# Patient Record
Sex: FEMALE | Race: WHITE | NOT HISPANIC OR LATINO | Employment: STUDENT | URBAN - METROPOLITAN AREA
[De-identification: names, ages, dates, MRNs, and addresses within clinical notes are randomized per-mention and may not be internally consistent; named-entity substitution may affect disease eponyms.]

---

## 2020-01-24 NOTE — TELEPHONE ENCOUNTER
FUTURE VISIT INFORMATION      FUTURE VISIT INFORMATION:    Date: 3/2/20    Time: 8:20 AM with SLP    Location: [facility]  REFERRAL INFORMATION:    Referring provider:  Self-Referred    Referring providers clinic:  NA    Reason for visit/diagnosis: Hoarseness and loss of vocal range    RECORDS REQUESTED FROM:       Clinic name Comments Records Status Imaging Status                                         * All Records in [facility], no records in [place] closing encounter - [place]

## 2020-03-02 ENCOUNTER — PRE VISIT (OUTPATIENT)
Dept: OTOLARYNGOLOGY | Facility: CLINIC | Age: 20
End: 2020-03-02

## 2020-03-02 ENCOUNTER — OFFICE VISIT (OUTPATIENT)
Dept: OTOLARYNGOLOGY | Facility: CLINIC | Age: 20
End: 2020-03-02
Payer: COMMERCIAL

## 2020-03-02 VITALS — HEIGHT: 65 IN | BODY MASS INDEX: 22.16 KG/M2 | WEIGHT: 133 LBS

## 2020-03-02 DIAGNOSIS — J38.7 IRRITABLE LARYNX: ICD-10-CM

## 2020-03-02 DIAGNOSIS — R49.0 MUSCLE TENSION DYSPHONIA: ICD-10-CM

## 2020-03-02 DIAGNOSIS — R49.0 DYSPHONIA: Primary | ICD-10-CM

## 2020-03-02 DIAGNOSIS — R09.89 CHRONIC THROAT CLEARING: ICD-10-CM

## 2020-03-02 RX ORDER — DESOGESTREL AND ETHINYL ESTRADIOL 21-5 (28)
KIT ORAL
COMMUNITY
Start: 2019-08-18

## 2020-03-02 RX ORDER — PANTOPRAZOLE SODIUM 20 MG/1
TABLET, DELAYED RELEASE ORAL
COMMUNITY
Start: 2019-11-19

## 2020-03-02 ASSESSMENT — PAIN SCALES - GENERAL: PAINLEVEL: NO PAIN (0)

## 2020-03-02 ASSESSMENT — MIFFLIN-ST. JEOR: SCORE: 1379.16

## 2020-03-02 NOTE — LETTER
3/2/2020      RE: Jayed Cevallos  [address] [address]       Dear Colleague:    I had the pleasure of meeting Jayde Cevallos in consultation at the [facility] of the [facility] at your request, for evaluation of dysphonia. The note from our visit follows. Speech recognition software may have been used in the documentation below; input is reviewed before signature to the best of my ability. I appreciate the opportunity to participate in the care of this pleasant patient.    Please feel free to contact me with any questions.    Sincerely yours,    Kimberly Zhu M.D., M.P.H.  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 Symptoms include throat tightness, mucus in throat and frequent throat-clearing.      Voice  ***She reports a five-month history of voice problems, since a potential exposure to volcanic tami while traveling in [place] in September.  She lost her voice for about three weeks after hiking in a volcano in [facility].  Her voice has improved somewhat since then, but she continues to have vocal fatigue and difficulty reaching high notes.  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  Typical vocal demand is extraordinary including singing and socializing.    She was seen for stroboscopy in [place] and was diagnosed with reflux. She tried a number of different reflux medications.  More recently, she was seen by a gastroenterologist back in the [place], and further evaluation was not recommended at that time as she really did not have symptoms consistent with classic GERD and the timeline of her symptoms was more closely  tied to being in [place].  Records from her GI visit are not currently available for review.***  ***  Reportedly, she was unaware that this appointment with us was being scheduled, as it was arranged by her father.  Her mother is a vocal  and has been helping her with her voice symptoms.      Swallowing  No concerns.       Cough/Throat-clearing  ***She has also had throat clearing since the middle of October.  This began suddenly and may be improving somewhat but is quite persistent overall.  She associates this with a feeling of mucus.  It typically begins with a sensation in her throat and is usually heavy and productive.      Breathing  No concerns.       Throat discomfort  No concerns.       Reflux-type symptoms  She experiences heartburn/indigestion rarely. She is taking reflux medications.  She did feel that they were helpful with reducing the feeling of throat swelling when she first started taking them in [place].  When she refilled the medications in the [place], she felt that perhaps they were not helping as much.       MEDICATIONS:     Current Outpatient Medications   Medication Sig Dispense Refill     desogestrel-ethinyl estradiol (KARIVA) 0.15-0.02/0.01 MG (21/5) tablet        pantoprazole (PROTONIX) 20 MG EC tablet          ALLERGIES:    Allergies   Allergen Reactions     Cats Itching, Other (See Comments) and Swelling       PAST MEDICAL HISTORY: No past medical history on file.     PAST SURGICAL HISTORY: No past surgical history on file.    HABITS/SOCIAL HISTORY:    Social History     Tobacco Use     Smoking status: Never Smoker     Smokeless tobacco: Never Used   Substance Use Topics     Alcohol use: Not on file         FAMILY HISTORY:  No family history on file. Noncontributory.    REVIEW OF SYSTEMS:  The patient completed a comprehensive 11 point review of systems (below), which was reviewed. Positives are as noted below; pertinent findings are as noted in the HPI.     Patient Supplied  Answers to Review of Systems   ENT ROS 3/1/2020   Neurology Dizzy spells   Ears, Nose, Throat Nasal congestion or drainage   Gastrointestinal/Genitourinary Heartburn/indigestion            PHYSICAL EXAMINATION:  General: The patient was alert and conversant, and in no acute distress.    Eyes: PERRL, conjunctiva and lids normal, sclera nonicteric.  Nose: Anterior rhinoscopy: no gross abnormalities. no  bleeding; no  mucopurulence; septum grossly normal, mild mucoid drainage and/or crusting.  Oral cavity/oropharynx: No masses or lesions. Dentition in good condition. Floor of mouth and oral tongue soft to palpation. Tongue mobility and palate elevation intact and symmetric. 3+ tonsils bilaterally, soft to palpation bilaterally.  Ears: Normal auricles, external auditory canals bilaterally. Visualized portions of tympanic membranes normal bilaterally.   Neck: No palpable cervical lymphadenopathy. There was mild to moderate tenderness to palpation of the thyrohyoid space, which was narrow. No obvious thyroid abnormality. Landmarks palpable.  Resp: Breathing comfortably, no stridor or stertor.  Neuro: Symmetric facial function. Other cranial nerves as documented above.  Psych: Normal affect, pleasant and cooperative.  Voice/speech: No significant dysphonia of speaking voice.  Extremities: No cyanosis, clubbing, or edema of the upper extremities.      Intake scores  Total Score for Last Patient-Answered VHI Questionnaire  No flowsheet data found.  Total Score for Last Patient-Answered EAT Questionnaire  No flowsheet data found.  Total Score for Last Patient-Answered CSI Questionnaire  No flowsheet data found.      PROCEDURE:   Flexible fiberoptic laryngoscopy and laryngovideostroboscopy  Indications: This procedure was warranted to evaluate the patient's laryngeal anatomy and function. Risks, benefits, and alternatives were discussed.  Description: After written informed consent was obtained, a time-out was performed to  confirm patient identity, procedure, and procedure site. Topical 3% lidocaine with 0.25% phenylephrine was applied to the nasal cavities. I performed the endoscopy and no complications were apparent. Continuous and stroboscopic light were utilized to assess routine phonation and variable frequency phonation.  Performed by: Kimberly Zhu MD MPH  SLP: Steve Kramer MM, MA, CCC-SLP   Findings: Normal nasopharynx. Normal base of tongue, valleculae, and epiglottis. Vocal fold mobility: right: normal; left: normal. Medial edges of the vocal folds: smooth and straight. No focal mucosal lesions were observed on the true vocal folds. Glissade produced appropriate elongation. There was moderate to severe supraglottic recruitment with connected speech. Mucosa of false vocal folds, aryepiglottic folds, piriform sinuses, and posterior glottis unremarkable. Mild scattered lymphoid tissue. Airway was patent. Response to the therapy probes was good. No focal lesions on NBI.    The addition of stroboscopy allowed evaluation of the mucosal wave.   Amplitude: right: normal; left: normal. Symmetry: good symmetry. Closure pattern: complete. Closure plane: at glottic level. Phase distribution: normal.           IMPRESSION AND PLAN:   Jayde Cevallos presents with muscle tension dysphonia and irritable larynx symptoms.  She is currently on reflux medications that she felt were initially helpful, but is less certain whether they are helpful currently.    I recommended speech therapy as initial primary management, with goals including improving laryngeal hygiene, reducing laryngeal irritability and improving laryngeal efficiency.  With respect to her reflux medications, we discussed that options include staying on her current dosage until she is out of better place vocally, then considering weaning off, versus going ahead with the weaning given that the patient feels her symptoms are not really benefiting from the reflux  medications currently.  We did discuss that if she attempts to wean but becomes symptomatic, she should resume the medications until she can start voice therapy.    We also spoke with the patient's mother on Face Time today.  She raised questions about the patient's pulmonary status, and I deferred those questions to her primary care provider.      We also discussed that the patient does not have a significantly increased amount of mucus in her throat today, but that she does appear to be hypersensitive to the amount of mucus that is there.  Both the patient and her mother's questions were answered, I believe, to their satisfaction.    She will return as needed. I appreciate the opportunity to participate in the care of this pleasant patient.             Kimberly Zhu MD

## 2020-03-02 NOTE — PATIENT INSTRUCTIONS
Thank you for choosing  [facility]  Please follow up with Dr. Zhu as needed.    Dr. Zhu recommends speech therapy at this time.   [phone] appointment scheduling option 1 and nurse advice option 3.

## 2020-03-02 NOTE — Clinical Note
3/2/2020       RE: Jayde Cevallos  [address] [facility] [address]     Dear Colleague,    Thank you for referring your patient, Jayde Cevallos, to the [facility] at [facility]. Please see a copy of my visit note below.    Dear Colleague:    I had the pleasure of meeting Jayde Cevallos in consultation at the [facility] of the [facility] at your request, for evaluation of dysphonia. The note from our visit follows. Speech recognition software may have been used in the documentation below; input is reviewed before signature to the best of my ability. I appreciate the opportunity to participate in the care of this pleasant patient.    Please feel free to contact me with any questions.    Sincerely yours,    Kimberly Zhu M.D., M.P.H.  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 Symptoms include throat tightness, mucus in throat and frequent throat-clearing.      Voice  ***She reports a five-month history of voice problems, since a potential exposure to volcanic tami while traveling in [place] in September.  She lost her voice for about three weeks after hiking in a volcano in [facility].  Her voice has improved somewhat since then, but she continues to have vocal fatigue and difficulty reaching high notes.  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  Typical vocal demand is extraordinary including singing and socializing.    She was seen for stroboscopy in [place] and was diagnosed with reflux. She tried a number of different reflux medications.  More recently, she was seen by a  gastroenterologist [place] in the [place], and further evaluation was not recommended at that time as she really did not have symptoms consistent with classic GERD and the timeline of her symptoms was more closely tied to being in [place].  Records from her GI visit are not currently available for review.***  ***  Reportedly, she was unaware that this appointment with us was being scheduled, as it was arranged by her father.  Her mother is a vocal  and has been helping her with her voice symptoms.      Swallowing  No concerns.       Cough/Throat-clearing  ***She has also had throat clearing since the middle of October.  This began suddenly and may be improving somewhat but is quite persistent overall.  She associates this with a feeling of mucus.  It typically begins with a sensation in her throat and is usually heavy and productive.      Breathing  No concerns.       Throat discomfort  No concerns.       Reflux-type symptoms  She experiences heartburn/indigestion rarely. She is taking reflux medications.  She did feel that they were helpful with reducing the feeling of throat swelling when she first started taking them in [place].  When she refilled the medications in the [place], she felt that perhaps they were not helping as much.       MEDICATIONS:     Current Outpatient Medications   Medication Sig Dispense Refill     desogestrel-ethinyl estradiol (KARIVA) 0.15-0.02/0.01 MG (21/5) tablet        pantoprazole (PROTONIX) 20 MG EC tablet          ALLERGIES:    Allergies   Allergen Reactions     Cats Itching, Other (See Comments) and Swelling       PAST MEDICAL HISTORY: No past medical history on file.     PAST SURGICAL HISTORY: No past surgical history on file.    HABITS/SOCIAL HISTORY:    Social History     Tobacco Use     Smoking status: Never Smoker     Smokeless tobacco: Never Used   Substance Use Topics     Alcohol use: Not on file         FAMILY HISTORY:  No family history on file.  Noncontributory.    REVIEW OF SYSTEMS:  The patient completed a comprehensive 11 point review of systems (below), which was reviewed. Positives are as noted below; pertinent findings are as noted in the HPI.     Patient Supplied Answers to Review of Systems   ENT ROS 3/1/2020   Neurology Dizzy spells   Ears, Nose, Throat Nasal congestion or drainage   Gastrointestinal/Genitourinary Heartburn/indigestion            PHYSICAL EXAMINATION:  General: The patient was alert and conversant, and in no acute distress.    Eyes: PERRL, conjunctiva and lids normal, sclera nonicteric.  Nose: Anterior rhinoscopy: no gross abnormalities. no  bleeding; no  mucopurulence; septum grossly normal, mild mucoid drainage and/or crusting.  Oral cavity/oropharynx: No masses or lesions. Dentition in good condition. Floor of mouth and oral tongue soft to palpation. Tongue mobility and palate elevation intact and symmetric. 3+ tonsils bilaterally, soft to palpation bilaterally.  Ears: Normal auricles, external auditory canals bilaterally. Visualized portions of tympanic membranes normal bilaterally.   Neck: No palpable cervical lymphadenopathy. There was mild to moderate tenderness to palpation of the thyrohyoid space, which was narrow. No obvious thyroid abnormality. Landmarks palpable.  Resp: Breathing comfortably, no stridor or stertor.  Neuro: Symmetric facial function. Other cranial nerves as documented above.  Psych: Normal affect, pleasant and cooperative.  Voice/speech: No significant dysphonia of speaking voice.  Extremities: No cyanosis, clubbing, or edema of the upper extremities.      Intake scores  Total Score for Last Patient-Answered VHI Questionnaire  No flowsheet data found.  Total Score for Last Patient-Answered EAT Questionnaire  No flowsheet data found.  Total Score for Last Patient-Answered CSI Questionnaire  No flowsheet data found.      PROCEDURE:   Flexible fiberoptic laryngoscopy and  laryngovideostroboscopy  Indications: This procedure was warranted to evaluate the patient's laryngeal anatomy and function. Risks, benefits, and alternatives were discussed.  Description: After written informed consent was obtained, a time-out was performed to confirm patient identity, procedure, and procedure site. Topical 3% lidocaine with 0.25% phenylephrine was applied to the nasal cavities. I performed the endoscopy and no complications were apparent. Continuous and stroboscopic light were utilized to assess routine phonation and variable frequency phonation.  Performed by: Kimberly Zhu MD MPH  SLP: Steve Kramer MM, MA, CCC-SLP   Findings: Normal nasopharynx. Normal base of tongue, valleculae, and epiglottis. Vocal fold mobility: right: normal; left: normal. Medial edges of the vocal folds: smooth and straight. No focal mucosal lesions were observed on the true vocal folds. Glissade produced appropriate elongation. There was moderate to severe supraglottic recruitment with connected speech. Mucosa of false vocal folds, aryepiglottic folds, piriform sinuses, and posterior glottis unremarkable. Mild scattered lymphoid tissue. Airway was patent. Response to the therapy probes was good. No focal lesions on NBI.    The addition of stroboscopy allowed evaluation of the mucosal wave.   Amplitude: right: normal; left: normal. Symmetry: good symmetry. Closure pattern: complete. Closure plane: at glottic level. Phase distribution: normal.           IMPRESSION AND PLAN:   Jayde Cevallos presents with muscle tension dysphonia and irritable larynx symptoms.  She is currently on reflux medications that she felt were initially helpful, but is less certain whether they are helpful currently.    I recommended speech therapy as initial primary management, with goals including improving laryngeal hygiene, reducing laryngeal irritability and improving laryngeal efficiency.  With respect to her reflux  medications, we discussed that options include staying on her current dosage until she is out of better place vocally, then considering weaning off, versus going ahead with the weaning given that the patient feels her symptoms are not really benefiting from the reflux medications currently.  We did discuss that if she attempts to wean but becomes symptomatic, she should resume the medications until she can start voice therapy.    We also spoke with the patient's mother on Face Time today.  She raised questions about the patient's pulmonary status, and I deferred those questions to her primary care provider.      We also discussed that the patient does not have a significantly increased amount of mucus in her throat today, but that she does appear to be hypersensitive to the amount of mucus that is there.  Both the patient and her mother's questions were answered, I believe, to their satisfaction.    She will return as needed. I appreciate the opportunity to participate in the care of this pleasant patient.             Again, thank you for allowing me to participate in the care of your patient.      Sincerely,    Kimberly Zhu MD

## 2020-03-02 NOTE — PROGRESS NOTES
[facility]  Evaluation report    Clinician: Steve Kramer M.M., M.A., CCC/SLP  Seen in conjunction with: Dr. 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  After hiking on a [place] in [facility] she subsequently lost her voice for 3 weeks.  When symptoms failed to resolve she was seen by an otolaryngologist in [place] who performed video stroboscopy.  Per patient's report she was told that she had reflux but no other changes, and she was encouraged to not to sing high though ongoing singing was allowed.  Her post family included a physician who also helped her manage reflux symptoms and she has been on pantoprazole since that time.  She feels that this helped her symptoms initially, but is unclear whether she is continuing to derive benefit.  She had significant vocal responsibilities during her time there and did not want to lose the opportunity since her tenure abroad was short-lived so she pushed through vocally during that time.  Upon returning she was seen by gastroenterology for a consult and they did not feel her symptoms were consistent with gastroesophageal reflux.  No formal testing was done at that time.  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  She feels that her voice has continued to improve to some degree about access to upper register pitches is somewhat limited.  Her grandmother attended the [employer] for a PhD in voice and was familiar with our clinic and recommended the patient follow-up with us for which she presents today.    CURRENT SYMPTOMS INCLUDE  VOICE    She describes this as  "\"a small problem\" that bothers her quite a bit    Extraordinary vocal demands    Vocal demand    Reduced access to upper register    Worse in the mornings    Constant    Starts with a sensation in her throat    Usually productive    COUGH/THROAT CLEARING    Began approximately one month after voice changes    Frequent throat clearing    Sensation of mucus in throat     ADDITIONAL    Heartburn / indigestion    No clear symptoms    Patient denies significant dyspnea, dysphagia and pain.     OTHER PERTINENT HISTORY    Otherwise unknown.  Please also refer to Dr. Zhu's dictation.     No past medical history on file.  No past surgical history on file.    OBJECTIVE  PATIENT REPORTED MEASURES    Effort to talk: 0 / 10 (0-10 in which 10 represents maximal effort)    Effort to sin / 10 (0-10 in which 10 represents maximal effort)    Voice quality: 10 / 10 (0-10 in which 10 represents best possible voice)  Patient Supplied Answers To VHI Questionnaire  Voice Handicap Index (VHI-10) 3/1/2020   My voice makes it difficult for people to hear me 0   People have difficulty understanding me in a noisy room 0   My voice difficulties restrict my personal and social life.  1   I feel left out of conversations because of my voice 1   My voice problem causes me to lose income 0   I feel as though I have to strain to produce voice 2   The clarity of my voice is unpredictable 2   My voice problem upsets me 3   My voice makes me feel handicapped 2   People ask, \"What's wrong with your voice?\" 2   VHI-10 13     Patient Supplied Answers To CSI Questionnaire  Cough Severity Index (CSI) 3/1/2020   My cough is worse when I lie down 0   My coughing problem causes me to restrict my personal and social life 0   I tend to avoid places because of my cough problem 0   I feel embarrassed because of my coughing problem 2   People ask, ''What's wrong?'' because I cough a lot 0   I run out of air when I cough 0   My coughing problem affects my " "voice 2   My coughing problem limits my physical activity 0   My coughing problem upsets me 2   People ask me if I am sick because I cough a lot 0   CSI Score 6     PERCEPTUAL EVALUATION (CPT 32934)  POSTURE / TENSION:     neck and shoulders    BREATHING:     appears within normal limits and adequate     LARYNGEAL PALPATION:     tenderness of the thyrohyoid area    reduced thyrohyoid space    VOICE:    Roughness: Minimal    Breathiness: Minimal    Strain: Mild to moderate Intermittent (with approach to loft register)    Loudness    Conversational speech:  WNL    Projected speech:  WNL    Pitch:    Conversational speech:  Mildly lowered    Pitch glide:     Adequate access to loft register    Self-limited on glides initially    Tendency toward pitch matching the clinician    Resonance:    Conversational speech: intermittent laryngeal pharyngeal resonance    Singing vs. Speech:  Decreased strain in modal singing vs. Running speech    CAPE-V Overall Severity:  11/100    COUGH/THROAT CLEARING:    Occasional non-productive throat clearing    Locus of cough/ throat clear: sounds consistent with upper airway     THERAPY PROBES: Improvement was elicited with use of forward resonant stimuli and coordination of respiration and phonation    ____________________________________________________________________  Laryngeal Function Studies (CPT 72138)  Acoustic measures:  Fundamental frequency Metrics     /a/ mean F0 = 208 Hz (SD = 1.08 Hz)     /i/ mean F0 = 273 Hz (SD = 1.24 Hz)     Roy Passage Mean f0 = 193 Hz (SD 28.36 Hz)     Glide:         Min F0 = 202 Hz        Max F0 = 1321 Hz        Range = 1118 Hz    Cepstral Measures     CPPS /a/ = 24.48 dB     CPPS /i/ = 25.53 dB     CPPS \"all voiced\" = 24.47 dB     AVQI (v.3.01) = 2.07    Additional Measures     Harmonic to Noise Ratio /a/ = 26.39 dB     Harmonic to Noise Ratio: Roy passage = 14.53 dB     Jitter (local) /a/ = 0.304 %     Shimmer (local) /a/ = 1.821 " %    Aerodynamic Measures     Protocol / Parameter Result Normative Mean (SD)   Vital Capacity     Expiratory Volume 3.81 Liters 3.08 (0.57) Liters   Comfortable Sustained Phonation     Mean SPL 70.59 dB 77.33 (5.07) dB   Mean Pitch 233.23 Hz 212.55 (21.98) Hz   Peak Expiratory Airflow 0.263 Lit/Sec 0.2 (0.11) Lit/Sec   Mean Expiratory Airflow 0.206 Lit/Sec 0.13 (0.08) Lit/Sec   Voicing Efficiency     Peak Air Pressure 7.6 cm H2O 6.65 (1.96) cm H2O   Mean Peak Air Pressure 7.35 cm H2O 5.57 (1.72) cm H2O   Peak Expiratory Airflow 0.247 Lit/Sec 0.19 (0.1) Lit/Sec   Mean Airflow During Voicing 0.194 Lit/Sec 0.11 (0.05) Lit/Sec   Running Speech: All Voiced Stimulus     Mean SPL 63.22 dB    Mean Pitch 196.68 Hz    Peak Expiratory Airflow 0.195 Lit/Sec    Mean Expiratory Airflow 0.09 Lit/Sec    Mean Airflow During Voicing 0.089 Lit/Sec    Running Speech: Grandfather Passage     Mean SPL 62.03 dB    SPL Range 56.56 dB    Mean Pitch 211.22 Hz    Pitch Range 254.77 Hz    Peak Expiratory Airflow 0.911 Lit/Sec    Mean Expiratory Airflow 0.138 Lit/Sec    Expiratory Volume 2.31 Liters    Mean Airflow During Voicing 0.113 Lit/Sec    Peak Inspiratory Airflow -2.137 Lit/Sec    Inspiratory Volume -1.83 Liters    ____________________________________________________________________    LARYNGEAL EXAMINATION  Procedure: Flexible endoscopy with chip-tip technology with stroboscopy, right nostril; topical anesthesia with 3% Lidocaine and 0.25% phenylephrine was applied.   Performed by: Dr. Zhu   The laryngeal and pharyngeal structures were evaluated for gross appearance, mobility, function, and focal lesions / abnormalities of the associated mucosa.  Stroboscopy was warranted to evaluate closure, symmetry, and vibratory characteristics of the vocal folds.  All findings were within normal limits with the exception of the following salient features:     Essentially healthy laryngeal and vocal fold mucosa    No significant presence of  thickened secretions    Significantly large tonsils (see Dr. Zhu's note for full details)    With phonatory tasks there is moderate anterior posterior supraglottic hyperfunction (more significant in speech versus singing)    Decreased hyperfunction is noted with forward resonance stimuli and coordination of phonation and respiration    Stroboscopy demonstrates smooth straight vibratory margins, complete closure, and essentially normal vibratory function.      Essentially healthy vocal fold mucosa    The laryngeal exam was reviewed with MsVaughn Ajlamontelogan, and I provided pertinent explanations, as well as written and oral information.    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  Today's extensive evaluation demonstrates  R49.0 (Dysphonia) and R68.89 (Chronic Throat Clearing) in the context of J38.7 (Irritable Larynx Syndrome), an imbalance in function of the intrinsic and extrinsic muscles of the larynx and nonoptimal phonatory respiratory coordination.  Laryngeal evaluation is unremarkable with the exception of significantly large tonsils, and moderate anterior posterior supraglottic hyperfunction during running speech corresponding to areas of tension on laryngeal palpation.  Laryngeal function studies demonstrate increased inferred subglottal pressure, and a pattern of decreasing trans-glottal airflow and more naturalistic contexts corresponding to supraglottic hyperfunction on exam.    STIMULABILITY: results of therapy probes during perceptual and laryngeal evaluation demonstrate improvement with use of forward resonant stimuli, coordination of respiration and phonation and use of yawn sigh    RECOMMENDATIONS:     A course of speech therapy is recommended to optimize vocal technique,  improve voice quality, promote reduced discomfort, effort and fatigue and help reduce throat clear.     As part of education basics of vocal hygiene and the importance of cough suppression were discussed and associated reading materials on irritable larynx syndrome were provided.    She demonstrates a Excellent prognosis for improvement given adherence to therapeutic recommendations.     Positive indicators: positive response to therapy probes diagnosis is known to respond to treatment good awareness of patterns of use    Negative indicators: none    DURATION / FREQUENCY: 4 biweekly and 2 monthly one-hour sessions    GOALS:  Patient goal:   1. To improve and maintain a healthy voice quality  2. To understand the problem and fix it as much as possible    Short-term goal(s): Within the first 4 sessions, Ms. Cevallos:  1. will demonstrate assigned laryngeal massage techniques with 80% accuracy or better with no clinician support  2. will be able to demonstrate provided cough suppression and substitution strategies from memory independently with 90% accuracy  3. will be able to independently list key factors in maintenance of good vocal hygiene with 80% accuracy, and report on their use outside the therapy room.  4. will demonstrate semi-occluded vocal tract (SOVT) exercises with at least 80% accuracy with no clinician support  5. will demonstrate the ability to alternate between target and habitual voice quality given clinician cue 75% of the time during therapy tasks    Long-term goal(s): In 6 months, Ms. Cevallos will:  1. Report a week of typical vocal activities, in which dysphonia and Throat clearing do not exceed a level of 2 out of 10, 80% of the time     This treatment plan was developed with the patient who agreed with the recommendations.    TOTAL SERVICE TIME: 80 minutes  EVALUATION OF VOICE AND RESONANCE (45900)  LARYNGEAL FUNCTION STUDIES (52765)  NO CHARGE FACILITY FEE (32835)    Steve Kramer M.M.,  M.A., [facility]SLP  Speech-Language Pathologist  Certificate of Vocology  [phone]    *this report was created in part through the use of computerized dictation software, and though reviewed following completion, some typographic errors may persist.  If there is confusion regarding any of this notes contents, please contact me for clarification.*

## 2020-03-02 NOTE — LETTER
Date:March 19, 2020      Patient was self referred, no letter generated. Do not send.        [facility] Health Information

## 2020-03-02 NOTE — LETTER
3/2/2020       RE: Jayde Cevallos  [address] [facility] [address]     Dear Colleague,    Thank you for referring your patient, Jadye Cevallos, to the [facility] at [facility]. Please see a copy of my visit note below.    Dear Colleague:    I had the pleasure of meeting Jayde Cevallos in consultation at the [facility] of the [facility] at your request, for evaluation of dysphonia. The note from our visit follows. Speech recognition software may have been used in the documentation below; input is reviewed before signature to the best of my ability. I appreciate the opportunity to participate in the care of this pleasant patient.    Please feel free to contact me with any questions.    Sincerely yours,    Kimberly Zhu M.D., M.P.H.  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 Symptoms include throat tightness, mucus in throat and frequent throat-clearing.      Voice  ***She reports a five-month history of voice problems, since a potential exposure to volcanic tami while traveling in [place] in September.  She lost her voice for about three weeks after hiking in a volcano in [facility].  Her voice has improved somewhat since then, but she continues to have vocal fatigue and difficulty reaching high notes.  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  Typical vocal demand is extraordinary including singing and socializing.    She was seen for stroboscopy in [place] and was diagnosed with reflux. She tried a number of different reflux medications.  More recently, she was seen by a  gastroenterologist back in the [place], and further evaluation was not recommended at that time as she really did not have symptoms consistent with classic GERD and the timeline of her symptoms was more closely tied to being in [place].  Records from her GI visit are not currently available for review.***  ***  Reportedly, she was unaware that this appointment with us was being scheduled, as it was arranged by her father.  Her mother is a vocal  and has been helping her with her voice symptoms.      Swallowing  No concerns.       Cough/Throat-clearing  ***She has also had throat clearing since the middle of October.  This began suddenly and may be improving somewhat but is quite persistent overall.  She associates this with a feeling of mucus.  It typically begins with a sensation in her throat and is usually heavy and productive.      Breathing  No concerns.       Throat discomfort  No concerns.       Reflux-type symptoms  She experiences heartburn/indigestion rarely. She is taking reflux medications.  She did feel that they were helpful with reducing the feeling of throat swelling when she first started taking them in [place].  When she refilled the medications in the [place], she felt that perhaps they were not helping as much.       MEDICATIONS:     Current Outpatient Medications   Medication Sig Dispense Refill     desogestrel-ethinyl estradiol (KARIVA) 0.15-0.02/0.01 MG (21/5) tablet        pantoprazole (PROTONIX) 20 MG EC tablet          ALLERGIES:    Allergies   Allergen Reactions     Cats Itching, Other (See Comments) and Swelling       PAST MEDICAL HISTORY: No past medical history on file.     PAST SURGICAL HISTORY: No past surgical history on file.    HABITS/SOCIAL HISTORY:    Social History     Tobacco Use     Smoking status: Never Smoker     Smokeless tobacco: Never Used   Substance Use Topics     Alcohol use: Not on file         FAMILY HISTORY:  No family history on file.  Noncontributory.    REVIEW OF SYSTEMS:  The patient completed a comprehensive 11 point review of systems (below), which was reviewed. Positives are as noted below; pertinent findings are as noted in the HPI.     Patient Supplied Answers to Review of Systems   ENT ROS 3/1/2020   Neurology Dizzy spells   Ears, Nose, Throat Nasal congestion or drainage   Gastrointestinal/Genitourinary Heartburn/indigestion            PHYSICAL EXAMINATION:  General: The patient was alert and conversant, and in no acute distress.    Eyes: PERRL, conjunctiva and lids normal, sclera nonicteric.  Nose: Anterior rhinoscopy: no gross abnormalities. no  bleeding; no  mucopurulence; septum grossly normal, mild mucoid drainage and/or crusting.  Oral cavity/oropharynx: No masses or lesions. Dentition in good condition. Floor of mouth and oral tongue soft to palpation. Tongue mobility and palate elevation intact and symmetric. 3+ tonsils bilaterally, soft to palpation bilaterally.  Ears: Normal auricles, external auditory canals bilaterally. Visualized portions of tympanic membranes normal bilaterally.   Neck: No palpable cervical lymphadenopathy. There was mild to moderate tenderness to palpation of the thyrohyoid space, which was narrow. No obvious thyroid abnormality. Landmarks palpable.  Resp: Breathing comfortably, no stridor or stertor.  Neuro: Symmetric facial function. Other cranial nerves as documented above.  Psych: Normal affect, pleasant and cooperative.  Voice/speech: No significant dysphonia of speaking voice.  Extremities: No cyanosis, clubbing, or edema of the upper extremities.      Intake scores  Total Score for Last Patient-Answered VHI Questionnaire  No flowsheet data found.  Total Score for Last Patient-Answered EAT Questionnaire  No flowsheet data found.  Total Score for Last Patient-Answered CSI Questionnaire  No flowsheet data found.      PROCEDURE:   Flexible fiberoptic laryngoscopy and  laryngovideostroboscopy  Indications: This procedure was warranted to evaluate the patient's laryngeal anatomy and function. Risks, benefits, and alternatives were discussed.  Description: After written informed consent was obtained, a time-out was performed to confirm patient identity, procedure, and procedure site. Topical 3% lidocaine with 0.25% phenylephrine was applied to the nasal cavities. I performed the endoscopy and no complications were apparent. Continuous and stroboscopic light were utilized to assess routine phonation and variable frequency phonation.  Performed by: Kimberly Zhu MD MPH  SLP: Steve Kramer MM, MA, CCC-SLP   Findings: Normal nasopharynx. Normal base of tongue, valleculae, and epiglottis. Vocal fold mobility: right: normal; left: normal. Medial edges of the vocal folds: smooth and straight. No focal mucosal lesions were observed on the true vocal folds. Glissade produced appropriate elongation. There was moderate to severe supraglottic recruitment with connected speech. Mucosa of false vocal folds, aryepiglottic folds, piriform sinuses, and posterior glottis unremarkable. Mild scattered lymphoid tissue. Airway was patent. Response to the therapy probes was good. No focal lesions on NBI.    The addition of stroboscopy allowed evaluation of the mucosal wave.   Amplitude: right: normal; left: normal. Symmetry: good symmetry. Closure pattern: complete. Closure plane: at glottic level. Phase distribution: normal.           IMPRESSION AND PLAN:   Jayde Cevallos presents with muscle tension dysphonia and irritable larynx symptoms.  She is currently on reflux medications that she felt were initially helpful, but is less certain whether they are helpful currently.    I recommended speech therapy as initial primary management, with goals including improving laryngeal hygiene, reducing laryngeal irritability and improving laryngeal efficiency.  With respect to her reflux  medications, we discussed that options include staying on her current dosage until she is out of better place vocally, then considering weaning off, versus going ahead with the weaning given that the patient feels her symptoms are not really benefiting from the reflux medications currently.  We did discuss that if she attempts to wean but becomes symptomatic, she should resume the medications until she can start voice therapy.    We also spoke with the patient's mother on Face Time today.  She raised questions about the patient's pulmonary status, and I deferred those questions to her primary care provider.      We also discussed that the patient does not have a significantly increased amount of mucus in her throat today, but that she does appear to be hypersensitive to the amount of mucus that is there.  Both the patient and her mother's questions were answered, I believe, to their satisfaction.    She will return as needed. I appreciate the opportunity to participate in the care of this pleasant patient.             Again, thank you for allowing me to participate in the care of your patient.      Sincerely,    Kimberly Zhu MD

## 2020-03-02 NOTE — NURSING NOTE
"Chief Complaint   Patient presents with     Consult     Dysphonia     Height 1.651 m (5' 5\"), weight 60.3 kg (133 lb).    Mona Oscar, EMT  "

## 2020-03-02 NOTE — PROGRESS NOTES
Dear Colleague:    I had the pleasure of meeting Jayde Cevallos in consultation at the [facility] of the [facility] at your request, for evaluation of dysphonia. The note from our visit follows. Speech recognition software may have been used in the documentation below; input is reviewed before signature to the best of my ability. I appreciate the opportunity to participate in the care of this pleasant patient.    Please feel free to contact me with any questions.    Sincerely yours,    Kimberly Zhu M.D., M.P.H.  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 Symptoms include throat tightness, mucus in throat and frequent throat-clearing.      Voice  She reports a five-month history of voice problems, since a potential exposure to volcanic tami while traveling in [place] in September.  She lost her voice for about three weeks after hiking by a volcano in [facility].  Her voice has improved somewhat since then, but she continues to have vocal fatigue and difficulty reaching high notes.  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  Typical vocal demand is extraordinary including singing and socializing.    She was seen for stroboscopy in [place] and was diagnosed with reflux. She tried a number of different reflux medications.  More recently, she was seen by a gastroenterologist back in the [place], and further evaluation was not recommended at that time as she really did not have symptoms consistent with classic GERD and the timeline of her symptoms was more closely tied to being in [place].  Records from her GI visit are not currently available for  review.    Reportedly, she was unaware that this appointment with us was being scheduled, as it was arranged by her father.  Her mother is a vocal  and has been helping her with her voice symptoms.      Swallowing  No concerns.       Cough/Throat-clearing  She has also had throat clearing since the middle of October.  This began suddenly and may be improving somewhat but is quite persistent overall.  She associates this with a feeling of mucus.  It typically begins with a sensation in her throat and is usually heavy and productive.      Breathing  No concerns.       Throat discomfort  No concerns.       Reflux-type symptoms  She experiences heartburn/indigestion rarely. She is taking reflux medications.  She did feel that they were helpful with reducing the feeling of throat swelling when she first started taking them in [place].  When she refilled the medications in the [place], she felt that perhaps they were not helping as much.       MEDICATIONS:     Current Outpatient Medications   Medication Sig Dispense Refill     desogestrel-ethinyl estradiol (KARIVA) 0.15-0.02/0.01 MG (21/5) tablet        pantoprazole (PROTONIX) 20 MG EC tablet          ALLERGIES:    Allergies   Allergen Reactions     Cats Itching, Other (See Comments) and Swelling       PAST MEDICAL HISTORY: No past medical history on file.     PAST SURGICAL HISTORY: No past surgical history on file.    HABITS/SOCIAL HISTORY:    Social History     Tobacco Use     Smoking status: Never Smoker     Smokeless tobacco: Never Used   Substance Use Topics     Alcohol use: Not on file         FAMILY HISTORY:  No family history on file. Noncontributory.    REVIEW OF SYSTEMS:  The patient completed a comprehensive 11 point review of systems (below), which was reviewed. Positives are as noted below; pertinent findings are as noted in the HPI.     Patient Supplied Answers to Review of Systems  [facility] ROS 3/1/2020   Neurology Dizzy spells   Ears, Nose, Throat  Nasal congestion or drainage   Gastrointestinal/Genitourinary Heartburn/indigestion            PHYSICAL EXAMINATION:  General: The patient was alert and conversant, and in no acute distress.    Eyes: PERRL, conjunctiva and lids normal, sclera nonicteric.  Nose: Anterior rhinoscopy: no gross abnormalities. no  bleeding; no  mucopurulence; septum grossly normal, mild mucoid drainage and/or crusting.  Oral cavity/oropharynx: No masses or lesions. Dentition in good condition. Floor of mouth and oral tongue soft to palpation. Tongue mobility and palate elevation intact and symmetric. 3+ tonsils bilaterally, soft to palpation bilaterally.  Ears: Normal auricles, external auditory canals bilaterally. Visualized portions of tympanic membranes normal bilaterally.   Neck: No palpable cervical lymphadenopathy. There was mild to moderate tenderness to palpation of the thyrohyoid space, which was narrow. No obvious thyroid abnormality. Landmarks palpable.  Resp: Breathing comfortably, no stridor or stertor.  Neuro: Symmetric facial function. Other cranial nerves as documented above.  Psych: Normal affect, pleasant and cooperative.  Voice/speech: No significant dysphonia of speaking voice.  Extremities: No cyanosis, clubbing, or edema of the upper extremities.      Intake scores  Total Score for Last Patient-Answered VHI Questionnaire  No flowsheet data found.  Total Score for Last Patient-Answered EAT Questionnaire  No flowsheet data found.  Total Score for Last Patient-Answered CSI Questionnaire  No flowsheet data found.      PROCEDURE:   Flexible fiberoptic laryngoscopy and laryngovideostroboscopy  Indications: This procedure was warranted to evaluate the patient's laryngeal anatomy and function. Risks, benefits, and alternatives were discussed.  Description: After written informed consent was obtained, a time-out was performed to confirm patient identity, procedure, and procedure site. Topical 3% lidocaine with 0.25%  phenylephrine was applied to the nasal cavities. I performed the endoscopy and no complications were apparent. Continuous and stroboscopic light were utilized to assess routine phonation and variable frequency phonation.  Performed by: Kimberly Zhu MD MPH  SLP: Steve Kramer MM, MA, CCC-SLP   Findings: Normal nasopharynx. Normal base of tongue, valleculae, and epiglottis. Vocal fold mobility: right: normal; left: normal. Medial edges of the vocal folds: smooth and straight. No focal mucosal lesions were observed on the true vocal folds. Glissade produced appropriate elongation. There was moderate to severe supraglottic recruitment with connected speech. Mucosa of false vocal folds, aryepiglottic folds, piriform sinuses, and posterior glottis unremarkable. Mild scattered lymphoid tissue. Airway was patent. Response to the therapy probes was good. No focal lesions on NBI.    The addition of stroboscopy allowed evaluation of the mucosal wave.   Amplitude: right: normal; left: normal. Symmetry: good symmetry. Closure pattern: complete. Closure plane: at glottic level. Phase distribution: normal.           IMPRESSION AND PLAN:   Jayde Cevallos presents with muscle tension dysphonia and irritable larynx symptoms.  She is currently on reflux medications that she felt were initially helpful, but is less certain whether they are helpful currently.    I recommended speech therapy as initial primary management, with goals including improving laryngeal hygiene, reducing laryngeal irritability and improving laryngeal efficiency.  With respect to her reflux medications, we discussed that options include staying on her current dosage until she is out of better place vocally, then considering weaning off, versus going ahead with the weaning given that the patient feels her symptoms are not really benefiting from the reflux medications currently.  We did discuss that if she attempts to wean but becomes symptomatic,  she should resume the medications until she can start voice therapy, with a plan to try a wean once her voice is doing better.    We also spoke with the patient's mother on Face Time today.  She raised questions about the patient's pulmonary status, and I deferred those questions to her primary care provider.      We also discussed that the patient does not have a significantly increased amount of mucus in her throat today, but that she does appear to be hypersensitive to the amount of mucus that is there.  Both the patient and her mother's questions were answered, I believe, to their satisfaction.    She will return if her symptoms persist or worsen despite these steps. I appreciate the opportunity to participate in the care of this pleasant patient.

## 2020-03-02 NOTE — LETTER
Date:March 19, 2020      Patient was self referred, no letter generated. Do not send.        [facility] Health Information

## 2020-03-02 NOTE — LETTER
3/2/2020       RE: Jayde Cevallos  [address] [address]     Dear Colleague,    Thank you for referring your patient, Jayde Cevallos, to the [facility]. Please see a copy of my visit note below.    [facility]  Evaluation report    Clinician: Steve Kramer M.M., M.A., CCC/SLP  Seen in conjunction with: Dr. Zuh  Referring physician:  Self  Patient: Jayde Cevallos  Date of Visit: 3/2/2020    HISTORY  Chief complaint: Jayde Cevallos is a 19 year old cuadra presenting today for evaluation of voice changes.    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  After hiking on a [place] in [facility] she subsequently lost her voice for 3 weeks.  When symptoms failed to resolve she was seen by an otolaryngologist in [place] who performed video stroboscopy.  Per patient's report she was told that she had reflux but no other changes, and she was encouraged to not to sing high though ongoing singing was allowed.  Her post family included a physician who also helped her manage reflux symptoms and she has been on pantoprazole since that time.  She feels that this helped her symptoms initially, but is unclear whether she is continuing to derive benefit.  She had significant vocal responsibilities during her time there and did not want to lose the opportunity since her tenure abroad was short-lived so she pushed through vocally during that time.  Upon returning she was seen by gastroenterology for a consult and they did not feel her symptoms were consistent with gastroesophageal reflux.  No formal testing was done at that time.  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  She feels that her voice has continued to improve to some degree about access to  "upper register pitches is somewhat limited.  Her grandmother attended the [employer] for a PhD in voice and was familiar with our clinic and recommended the patient follow-up with us for which she presents today.    CURRENT SYMPTOMS INCLUDE  VOICE    She describes this as \"a small problem\" that bothers her quite a bit    Extraordinary vocal demands    Vocal demand    Reduced access to upper register    Worse in the mornings    Constant    Starts with a sensation in her throat    Usually productive    COUGH/THROAT CLEARING    Began approximately one month after voice changes    Frequent throat clearing    Sensation of mucus in throat     ADDITIONAL    Heartburn / indigestion    No clear symptoms    Patient denies significant dyspnea, dysphagia and pain.     OTHER PERTINENT HISTORY    Otherwise unknown.  Please also refer to Dr. Zhu's dictation.     No past medical history on file.  No past surgical history on file.    OBJECTIVE  PATIENT REPORTED MEASURES    Effort to talk: 0 / 10 (0-10 in which 10 represents maximal effort)    Effort to sin / 10 (0-10 in which 10 represents maximal effort)    Voice quality: 10 / 10 (0-10 in which 10 represents best possible voice)  Patient Supplied Answers To VHI Questionnaire  Voice Handicap Index (VHI-10) 3/1/2020   My voice makes it difficult for people to hear me 0   People have difficulty understanding me in a noisy room 0   My voice difficulties restrict my personal and social life.  1   I feel left out of conversations because of my voice 1   My voice problem causes me to lose income 0   I feel as though I have to strain to produce voice 2   The clarity of my voice is unpredictable 2   My voice problem upsets me 3   My voice makes me feel handicapped 2   People ask, \"What's wrong with your voice?\" 2   VHI-10 13     Patient Supplied Answers To CSI Questionnaire  Cough Severity Index (CSI) 3/1/2020   My cough is worse when I lie down 0   My coughing problem " causes me to restrict my personal and social life 0   I tend to avoid places because of my cough problem 0   I feel embarrassed because of my coughing problem 2   People ask, ''What's wrong?'' because I cough a lot 0   I run out of air when I cough 0   My coughing problem affects my voice 2   My coughing problem limits my physical activity 0   My coughing problem upsets me 2   People ask me if I am sick because I cough a lot 0   CSI Score 6     PERCEPTUAL EVALUATION (CPT 42730)  POSTURE / TENSION:     neck and shoulders    BREATHING:     appears within normal limits and adequate     LARYNGEAL PALPATION:     tenderness of the thyrohyoid area    reduced thyrohyoid space    VOICE:    Roughness: Minimal    Breathiness: Minimal    Strain: Mild to moderate Intermittent (with approach to loft register)    Loudness    Conversational speech:  WNL    Projected speech:  WNL    Pitch:    Conversational speech:  Mildly lowered    Pitch glide:     Adequate access to loft register    Self-limited on glides initially    Tendency toward pitch matching the clinician    Resonance:    Conversational speech: intermittent laryngeal pharyngeal resonance    Singing vs. Speech:  Decreased strain in modal singing vs. Running speech    CAPE-V Overall Severity:  11/100    COUGH/THROAT CLEARING:    Occasional non-productive throat clearing    Locus of cough/ throat clear: sounds consistent with upper airway     THERAPY PROBES: Improvement was elicited with use of forward resonant stimuli and coordination of respiration and phonation    ____________________________________________________________________  Laryngeal Function Studies (CPT 41498)  Acoustic measures:  Fundamental frequency Metrics     /a/ mean F0 = 208 Hz (SD = 1.08 Hz)     /i/ mean F0 = 273 Hz (SD = 1.24 Hz)     [place] Passage Mean f0 = 193 Hz (SD 28.36 Hz)     Glide:         Min F0 = 202 Hz        Max F0 = 1321 Hz        Range = 1118 Hz    Cepstral Measures     CPPS /a/ = 24.48  "dB     CPPS /i/ = 25.53 dB     CPPS \"all voiced\" = 24.47 dB     AVQI (v.3.01) = 2.07    Additional Measures     Harmonic to Noise Ratio /a/ = 26.39 dB     Harmonic to Noise Ratio: [place] passage = 14.53 dB     Jitter (local) /a/ = 0.304 %     Shimmer (local) /a/ = 1.821 %    Aerodynamic Measures     Protocol / Parameter Result Normative Mean (SD)   Vital Capacity     Expiratory Volume 3.81 Liters 3.08 (0.57) Liters   Comfortable Sustained Phonation     Mean SPL 70.59 dB 77.33 (5.07) dB   Mean Pitch 233.23 Hz 212.55 (21.98) Hz   Peak Expiratory Airflow 0.263 Lit/Sec 0.2 (0.11) Lit/Sec   Mean Expiratory Airflow 0.206 Lit/Sec 0.13 (0.08) Lit/Sec   Voicing Efficiency     Peak Air Pressure 7.6 cm H2O 6.65 (1.96) cm H2O   Mean Peak Air Pressure 7.35 cm H2O 5.57 (1.72) cm H2O   Peak Expiratory Airflow 0.247 Lit/Sec 0.19 (0.1) Lit/Sec   Mean Airflow During Voicing 0.194 Lit/Sec 0.11 (0.05) Lit/Sec   Running Speech: All Voiced Stimulus     Mean SPL 63.22 dB    Mean Pitch 196.68 Hz    Peak Expiratory Airflow 0.195 Lit/Sec    Mean Expiratory Airflow 0.09 Lit/Sec    Mean Airflow During Voicing 0.089 Lit/Sec    Running Speech: [place] Passage     Mean SPL 62.03 dB    SPL Range 56.56 dB    Mean Pitch 211.22 Hz    Pitch Range 254.77 Hz    Peak Expiratory Airflow 0.911 Lit/Sec    Mean Expiratory Airflow 0.138 Lit/Sec    Expiratory Volume 2.31 Liters    Mean Airflow During Voicing 0.113 Lit/Sec    Peak Inspiratory Airflow -2.137 Lit/Sec    Inspiratory Volume -1.83 Liters    ____________________________________________________________________    LARYNGEAL EXAMINATION  Procedure: Flexible endoscopy with chip-tip technology with stroboscopy, right nostril; topical anesthesia with 3% Lidocaine and 0.25% phenylephrine was applied.   Performed by: Dr. Zhu   The laryngeal and pharyngeal structures were evaluated for gross appearance, mobility, function, and focal lesions / abnormalities of the associated mucosa.  Stroboscopy was " warranted to evaluate closure, symmetry, and vibratory characteristics of the vocal folds.  All findings were within normal limits with the exception of the following salient features:     Essentially healthy laryngeal and vocal fold mucosa    No significant presence of thickened secretions    Significantly large tonsils (see Dr. Zhu's note for full details)    With phonatory tasks there is moderate anterior posterior supraglottic hyperfunction (more significant in speech versus singing)    Decreased hyperfunction is noted with forward resonance stimuli and coordination of phonation and respiration    Stroboscopy demonstrates smooth straight vibratory margins, complete closure, and essentially normal vibratory function.      Essentially healthy vocal fold mucosa    The laryngeal exam was reviewed with Ms. Cevallos, and I provided pertinent explanations, as well as written and oral information.    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  Today's extensive evaluation demonstrates  R49.0 (Dysphonia) and R68.89 (Chronic Throat Clearing) in the context of J38.7 (Irritable Larynx Syndrome), an imbalance in function of the intrinsic and extrinsic muscles of the larynx and nonoptimal phonatory respiratory coordination.  Laryngeal evaluation is unremarkable with the exception of significantly large tonsils, and moderate anterior posterior supraglottic hyperfunction during running speech corresponding to areas of tension on laryngeal palpation.  Laryngeal function studies demonstrate increased inferred subglottal pressure, and a pattern of decreasing trans-glottal airflow and more naturalistic contexts corresponding to supraglottic hyperfunction on exam.    STIMULABILITY: results of therapy  probes during perceptual and laryngeal evaluation demonstrate improvement with use of forward resonant stimuli, coordination of respiration and phonation and use of yawn sigh    RECOMMENDATIONS:     A course of speech therapy is recommended to optimize vocal technique, improve voice quality, promote reduced discomfort, effort and fatigue and help reduce throat clear.     As part of education basics of vocal hygiene and the importance of cough suppression were discussed and associated reading materials on irritable larynx syndrome were provided.    She demonstrates a Excellent prognosis for improvement given adherence to therapeutic recommendations.     Positive indicators: positive response to therapy probes diagnosis is known to respond to treatment good awareness of patterns of use    Negative indicators: none    DURATION / FREQUENCY: 4 biweekly and 2 monthly one-hour sessions    GOALS:  Patient goal:   1. To improve and maintain a healthy voice quality  2. To understand the problem and fix it as much as possible    Short-term goal(s): Within the first 4 sessions, Ms. Cevallos:  1. will demonstrate assigned laryngeal massage techniques with 80% accuracy or better with no clinician support  2. will be able to demonstrate provided cough suppression and substitution strategies from memory independently with 90% accuracy  3. will be able to independently list key factors in maintenance of good vocal hygiene with 80% accuracy, and report on their use outside the therapy room.  4. will demonstrate semi-occluded vocal tract (SOVT) exercises with at least 80% accuracy with no clinician support  5. will demonstrate the ability to alternate between target and habitual voice quality given clinician cue 75% of the time during therapy tasks    Long-term goal(s): In 6 months, Ms. Cevallos will:  1. Report a week of typical vocal activities, in which dysphonia and Throat clearing do not exceed a level of 2 out of 10, 80% of the  time     This treatment plan was developed with the patient who agreed with the recommendations.    TOTAL SERVICE TIME: 80 minutes  EVALUATION OF VOICE AND RESONANCE (01138)  LARYNGEAL FUNCTION STUDIES (41880)  NO CHARGE FACILITY FEE (99352)    Steve Kramer M.M., M.A., CCC-SLP  Speech-Language Pathologist  Certificate of Vocology  [phone]    *this report was created in part through the use of computerized dictation software, and though reviewed following completion, some typographic errors may persist.  If there is confusion regarding any of this notes contents, please contact me for clarification.*      Again, thank you for allowing me to participate in the care of your patient.      Sincerely,    Ralph Kramer, SLP

## 2020-03-02 NOTE — LETTER
Date:March 3, 2020      Patient was self referred, no letter generated. Do not send.        [facility] Health Information

## 2020-03-11 ENCOUNTER — HEALTH MAINTENANCE LETTER (OUTPATIENT)
Age: 20
End: 2020-03-11

## 2020-04-08 ENCOUNTER — TELEPHONE (OUTPATIENT)
Dept: OTOLARYNGOLOGY | Facility: CLINIC | Age: 20
End: 2020-04-08

## 2020-04-08 NOTE — TELEPHONE ENCOUNTER
Attempted to contact patient to let her know that due to COVID-19 protocol we will not be seeing patients in clinic however VM is full. Will offer a telephone or video visit for appointment with provider on 4/16 via [employer].     Provider would prefer appt be rescheduled and overbooked into 1:30pm time on 4/13. If pt would like to do telephone visit for appt please confirm best number for contact. If pt would like video visit please confirm e-mail and let pt know a tip sheet for [facility] will be sent via [employer].

## 2020-04-13 ENCOUNTER — TELEPHONE (OUTPATIENT)
Dept: OTOLARYNGOLOGY | Facility: CLINIC | Age: 20
End: 2020-04-13

## 2020-04-13 NOTE — TELEPHONE ENCOUNTER
2nd Attempt to Contact Patient:    LVM letting pt know that due to COVID-19 protocol we will not be seeing patients in clinic. Offered a telephone or video visit for appointment with provider on 4/16. Left call center number for scheduling/rescheduling..     If pt would like to do telephone visit for 4/16 appt please confirm best number for contact. If pt would like video visit please confirm e-mail and let pt know a tip sheet for [facility] will be sent via [employer].

## 2021-01-04 ENCOUNTER — HEALTH MAINTENANCE LETTER (OUTPATIENT)
Age: 21
End: 2021-01-04

## 2021-04-25 ENCOUNTER — HEALTH MAINTENANCE LETTER (OUTPATIENT)
Age: 21
End: 2021-04-25

## 2021-10-10 ENCOUNTER — HEALTH MAINTENANCE LETTER (OUTPATIENT)
Age: 21
End: 2021-10-10

## 2022-05-22 ENCOUNTER — HEALTH MAINTENANCE LETTER (OUTPATIENT)
Age: 22
End: 2022-05-22

## 2022-09-18 ENCOUNTER — HEALTH MAINTENANCE LETTER (OUTPATIENT)
Age: 22
End: 2022-09-18

## 2023-06-04 ENCOUNTER — HEALTH MAINTENANCE LETTER (OUTPATIENT)
Age: 23
End: 2023-06-04